# Patient Record
Sex: FEMALE | Race: WHITE
[De-identification: names, ages, dates, MRNs, and addresses within clinical notes are randomized per-mention and may not be internally consistent; named-entity substitution may affect disease eponyms.]

---

## 2018-03-21 ENCOUNTER — HOSPITAL ENCOUNTER (OUTPATIENT)
Dept: HOSPITAL 61 - KCIC MAMMO | Age: 48
Discharge: HOME | End: 2018-03-21
Attending: FAMILY MEDICINE
Payer: COMMERCIAL

## 2018-03-21 DIAGNOSIS — Z12.31: Primary | ICD-10-CM

## 2018-03-21 PROCEDURE — 77067 SCR MAMMO BI INCL CAD: CPT

## 2018-03-21 PROCEDURE — 77063 BREAST TOMOSYNTHESIS BI: CPT

## 2021-07-12 ENCOUNTER — HOSPITAL ENCOUNTER (OUTPATIENT)
Dept: HOSPITAL 61 - KCIC MAMMO | Age: 51
End: 2021-07-12
Attending: FAMILY MEDICINE
Payer: COMMERCIAL

## 2021-07-12 DIAGNOSIS — M47.816: ICD-10-CM

## 2021-07-12 DIAGNOSIS — M51.36: ICD-10-CM

## 2021-07-12 DIAGNOSIS — E89.40: ICD-10-CM

## 2021-07-12 DIAGNOSIS — Z12.31: Primary | ICD-10-CM

## 2021-07-12 PROCEDURE — 72100 X-RAY EXAM L-S SPINE 2/3 VWS: CPT

## 2021-07-12 PROCEDURE — 77067 SCR MAMMO BI INCL CAD: CPT

## 2021-07-12 PROCEDURE — 77063 BREAST TOMOSYNTHESIS BI: CPT

## 2021-07-12 PROCEDURE — 77080 DXA BONE DENSITY AXIAL: CPT

## 2021-07-12 NOTE — [facility]
EXAM: Bilateral digital screening mammogram with tomosynthesis.



HISTORY: 50 year-old female presents for screening mammography.



TECHNIQUE: Full-field digital craniocaudal and mediolateral oblique 2D and 3D tomosynthesis images of
 both breasts are obtained for evaluation. Computer aided detection was applied.



COMPARISON: 3/21/2018



BREAST PARENCHYMAL DENSITY: Level B - Scattered fibroglandular densities.



FINDINGS: There has been interval increase in the size of right axillary lymph nose. There is a stabl
e nodule within the posterior lateral right breast which is likely an intramammary lymph node. There 
are stable areas of nodularity and asymmetry within both breasts. There is no suspicious calcificatio
n or architectural distortion.



IMPRESSION: BI-RADS Category 0: Incomplete. Additional imaging needed.



RECOMMENDATION: Sonographic imaging of the right axilla is recommended to assess increasing lymph nod
es. These may be reactive in etiology. Correlate for possible etiologies such as recent upper extremi
ty Covid-19 vaccination.



If your mammogram demonstrates that you have dense breast tissue, which could hide abnormalities, and
 if you have other risk factors for breast cancer that have been identified, you might benefit from s
upplemental screening tests that may be suggested by your ordering physician.  Dense breast tissue, i
n and of itself, is a relatively common condition.  This information is not provided to cause undue c
oncern, but rather to raise your awareness and to promote discussion with your physician regarding th
e presence of other risk factors, in addition to dense breast tissue. A report of your mammography re
sults will be sent to you and your physician.  You should contact your physician if you have any ques
tions or concerns regarding this report.  



Mammography is a sensitive method for finding small breast cancers, but it does not detect them all a
nd is not a substitute for careful clinical examination.  A negative mammogram does not negate a clin
ically suspicious finding and should not result in delay in biopsying a clinically suspicious abnorma
lity.



PQRS compliance statement -  Patient information was entered into a reminder system with a target due
 date for the next mammogram. 



"Our facility is accredited by the [employer] Mammography Program."



Electronically signed by: Rhonda Kessler MD (7/12/2021 2:22 PM) [id number]

## 2021-07-12 NOTE — [facility]
EXAM: LUMBAR SPINE 3 VIEWS.



HISTORY: Low back pain.



COMPARISON: None.



FINDINGS: There is a minimal upper lumbar dextrocurvature. Vertebral body heights are maintained, and
 no fractures are identified. Endplate remodeling indicates mild degenerative disc disease at L3-4. I
ntervertebral disc heights are preserved. Facet osteoarthritis appears at least moderate from L3 thro
ugh S1 on the right greater than left.



IMPRESSION:

1. Mild degenerative disc disease at L3-4.

2. Lower lumbar facet osteoarthritis on the right greater than left.



Electronically signed by: PÉREZ Ruiz MD (7/12/2021 2:43 PM) [id number]

## 2021-07-12 NOTE — [facility]
EXAM: DUAL ENERGY X-RAY ABSORPTIOMETRY (DEXA).



HISTORY: Postmenopausal screening.



FINDINGS: The lowest measured T-score is 1.5 in the lumbar spine, based on a bone mineral density of 
1.216 g/cm^2. Refer to the worksheets for full detail.



No comparison examinations are available. 



IMPRESSION:

Normal. Bone mineral density yields a T-score of -1.0 or greater. Fracture risk is low.



FRAX was not calculated.



METHODOLOGY: Dual energy x-ray absorptiometry was performed to measure bone mineral density. The foll
owing analysis is based on the 2019 Official Positions of the International Society for Clinical Dens
itometry: 



Measurements of the hips and the average of L1-L4 are preferred. When the spine and/or hip cannot be 
feasibly measured or interpreted, or in the setting of hyperparathyroidism, distal radial bone minera
l density may be measured. 



The lumbar spine T-score is based on the average bone mineral density of L1-L4. In the setting of art
ifact or anatomic abnormality, some lumbar levels may be excluded, and the remaining levels used for 
calculation. A single lumbar level is not used for diagnosis, and if only a single level is available
 for assessment, another anatomic site will be used to assign a diagnosis.



The hip T-score is based on the bone mineral density measurement of the femoral neck or total proxima
l femur of either side, whichever is lowest. Bilateral mean values are not used for diagnosis.



The forearm T-score is derived from 33% of the distal radius of the nondominant forearm.



For postmenopausal and perimenopausal women, and men age 50 or older, of all ethnic groups, T-scores 
are calculated through comparison of the current measurement with the NHANES III database standard fo
r  females aged 20-29 years. The lowest T-score of the evaluated anatomic sites is used to a
ssign a diagnosis based on the World Health Organization densitometric classification. 



In premenopausal females and males younger than age 50, a Z-score is calculated based on population s
pecific reference data for patient sex and self-reported ethnicity.



Electronically signed by: PÉREZ Ruiz MD (7/12/2021 2:42 PM) [id number]

## 2021-07-21 ENCOUNTER — HOSPITAL ENCOUNTER (OUTPATIENT)
Dept: HOSPITAL 61 - KCIC US | Age: 51
End: 2021-07-21
Attending: FAMILY MEDICINE
Payer: COMMERCIAL

## 2021-07-21 DIAGNOSIS — R22.2: Primary | ICD-10-CM

## 2021-07-21 PROCEDURE — 76705 ECHO EXAM OF ABDOMEN: CPT

## 2021-07-21 PROCEDURE — 76881 US COMPL JOINT R-T W/IMG: CPT

## 2021-07-21 NOTE — [facility]
EXAMINATION: US ABDOMEN LIMITED 7/21/2021 8:56 AM



INDICATION: Abdominal wall mass



TECHNIQUE: Limited Gray scale and color Doppler ultrasound images of the right anterior abdominal wal
l in the area of concern. 



COMPARISON: None.



FINDINGS:



No evidence of soft tissue mass, fluid collection, or abdominal wall hernia in the area of concern.



IMPRESSION: No sonographic abnormality in the abdominal wall in the area of concern.



Electronically signed by: Armida Harry MD (7/21/2021 1:45 PM) [id number]

## 2021-07-21 NOTE — [facility]
US EXT NON VASC RIGHT



History: Lump in right axilla



Comparison: None.



Technique: Sonographic examination of the right axilla.



Findings:

Patient unable to identify any discrete lumps at time of examination. There are a few normal-appearin
g lymph nodes which maintain normal and uniform cortex and fatty hilum. No masses or cysts identified
.



Impression: 

1.  Normal right axillary lymph nodes. No mass or cyst identified. Note patient unable to identify a 
discrete lump of concern.



Electronically signed by: Fede Soares MD (7/21/2021 4:31 PM) [facility]

## 2021-08-17 ENCOUNTER — HOSPITAL ENCOUNTER (OUTPATIENT)
Dept: HOSPITAL 61 - KCIC US | Age: 51
End: 2021-08-17
Attending: FAMILY MEDICINE
Payer: COMMERCIAL

## 2021-08-17 DIAGNOSIS — F31.9: ICD-10-CM

## 2021-08-17 DIAGNOSIS — F17.200: ICD-10-CM

## 2021-08-17 DIAGNOSIS — R10.9: ICD-10-CM

## 2021-08-17 DIAGNOSIS — Z12.2: Primary | ICD-10-CM

## 2021-08-17 PROCEDURE — 76700 US EXAM ABDOM COMPLETE: CPT

## 2021-08-17 PROCEDURE — 71271 CT THORAX LUNG CANCER SCR C-: CPT

## 2021-08-17 NOTE — [facility]
EXAM: CT LOW-DOSE LUNG SCREENING



CLINICAL HISTORY: Reason: SMOKER, LUNG CANCER SCREENING / Spl. Instructions:  / History: Smoker 30+ y
rs., 1 pk/day. No breathing complaints.  



COMPARISON: None available.



TECHNIQUE: Noncontrast helical low-dose CT chest per standard departmental protocol. 



PQRS compliance statement - One or more of the following individualized dose reduction techniques wer
e utilized for this study:

1.  Automated exposure control

2.  Adjustment of the mA and/or kV according to patient size

3.  Use of iterative reconstruction technique



FINDINGS:



Lung screening specific (LUNG-RADS): No suspicious lung nodule or mass.



Potentially significant incidental findings (LUNG-RADS category S): Subcutaneous soft tissue tissue i
nfiltration of the posterolateral aspect of the right chest wall just caudal to the scapula.



Other incidental findings: None





RECOMMENDATIONS/

IMPRESSION: 



1. LUNG-RADS category: 1. Recommend 12 month low-dose CT per ACR guidelines.



2. LUNG-RADS category S: Subcutaneous soft tissue tissue infiltration of the posterolateral aspect of
 the right chest wall just caudal to the scapula.



3. Other incidental findings as above.





LUNG-RADS category recommendations:



Category 1: Surveillance scan in 12 months per ACR guidelines



Category 2: Surveillance scan in 12 months per ACR guidelines



Category 3: Multidisciplinary consultation in the [facility].  The Lung Cancer Scre
mariely  coordinator will contact the patient to set up an appointment for further evaluation per ACR g
uidelines



Category 4: Multidisciplinary consultation in the Lung Cancer Screening Clinic. The Lung Cancer Screesequiel londono  coordinator will contact the patient to set up an appointment for further evaluation per ACR gu
idelines



Category S: Clinically significant or potentially significant findings



Category C: Patient with prior diagnosis of lung cancer who returns to screening



Category O:  Prior chest CT being located for comparison; part or all of lungs cannot be evaluated



Electronically signed by: Rafat Goddard MD (8/17/2021 12:02 PM) [id number]